# Patient Record
Sex: MALE | Race: WHITE | URBAN - METROPOLITAN AREA
[De-identification: names, ages, dates, MRNs, and addresses within clinical notes are randomized per-mention and may not be internally consistent; named-entity substitution may affect disease eponyms.]

---

## 2020-05-11 ENCOUNTER — APPOINTMENT (OUTPATIENT)
Dept: GENERAL RADIOLOGY | Facility: HOSPITAL | Age: 45
DRG: 312 | End: 2020-05-11
Attending: EMERGENCY MEDICINE
Payer: COMMERCIAL

## 2020-05-11 ENCOUNTER — APPOINTMENT (OUTPATIENT)
Dept: CT IMAGING | Facility: HOSPITAL | Age: 45
DRG: 312 | End: 2020-05-11
Attending: EMERGENCY MEDICINE
Payer: COMMERCIAL

## 2020-05-11 ENCOUNTER — HOSPITAL ENCOUNTER (INPATIENT)
Facility: HOSPITAL | Age: 45
LOS: 1 days | Discharge: HOME | DRG: 312 | End: 2020-05-12
Attending: EMERGENCY MEDICINE | Admitting: INTERNAL MEDICINE
Payer: COMMERCIAL

## 2020-05-11 DIAGNOSIS — R55 SYNCOPE AND COLLAPSE: Primary | ICD-10-CM

## 2020-05-11 PROCEDURE — 71045 X-RAY EXAM CHEST 1 VIEW: CPT | Performed by: EMERGENCY MEDICINE

## 2020-05-11 PROCEDURE — 99223 1ST HOSP IP/OBS HIGH 75: CPT | Performed by: HOSPITALIST

## 2020-05-11 PROCEDURE — 99255 IP/OBS CONSLTJ NEW/EST HI 80: CPT | Performed by: INTERNAL MEDICINE

## 2020-05-11 PROCEDURE — 70450 CT HEAD/BRAIN W/O DYE: CPT | Performed by: EMERGENCY MEDICINE

## 2020-05-11 RX ORDER — SODIUM CHLORIDE 9 MG/ML
INJECTION, SOLUTION INTRAVENOUS CONTINUOUS
Status: DISCONTINUED | OUTPATIENT
Start: 2020-05-11 | End: 2020-05-12

## 2020-05-11 RX ORDER — HYDRALAZINE HYDROCHLORIDE 20 MG/ML
10 INJECTION INTRAMUSCULAR; INTRAVENOUS EVERY 6 HOURS PRN
Status: DISCONTINUED | OUTPATIENT
Start: 2020-05-11 | End: 2020-05-12

## 2020-05-11 RX ORDER — ENOXAPARIN SODIUM 100 MG/ML
40 INJECTION SUBCUTANEOUS DAILY
Status: DISCONTINUED | OUTPATIENT
Start: 2020-05-11 | End: 2020-05-12

## 2020-05-11 RX ORDER — ONDANSETRON 2 MG/ML
4 INJECTION INTRAMUSCULAR; INTRAVENOUS EVERY 6 HOURS PRN
Status: DISCONTINUED | OUTPATIENT
Start: 2020-05-11 | End: 2020-05-12

## 2020-05-11 RX ORDER — PROCHLORPERAZINE EDISYLATE 5 MG/ML
5 INJECTION INTRAMUSCULAR; INTRAVENOUS EVERY 8 HOURS PRN
Status: DISCONTINUED | OUTPATIENT
Start: 2020-05-11 | End: 2020-05-12

## 2020-05-11 RX ORDER — MULTIVITAMIN WITH FOLIC ACID 400 MCG
1 TABLET ORAL DAILY
COMMUNITY

## 2020-05-11 RX ORDER — LOSARTAN POTASSIUM 25 MG/1
25 TABLET ORAL DAILY
Status: DISCONTINUED | OUTPATIENT
Start: 2020-05-11 | End: 2020-05-11

## 2020-05-11 RX ORDER — LOSARTAN POTASSIUM 25 MG/1
25 TABLET ORAL DAILY
Status: DISCONTINUED | OUTPATIENT
Start: 2020-05-11 | End: 2020-05-12

## 2020-05-11 RX ORDER — CHOLECALCIFEROL (VITAMIN D3) 50 MCG
2000 TABLET ORAL DAILY
COMMUNITY

## 2020-05-11 RX ORDER — LOSARTAN POTASSIUM 100 MG/1
100 TABLET ORAL DAILY
COMMUNITY

## 2020-05-11 RX ORDER — SODIUM CHLORIDE 9 MG/ML
INJECTION, SOLUTION INTRAVENOUS ONCE
Status: COMPLETED | OUTPATIENT
Start: 2020-05-11 | End: 2020-05-12

## 2020-05-11 RX ORDER — LOSARTAN POTASSIUM 25 MG/1
25 TABLET ORAL DAILY
COMMUNITY
End: 2020-05-11

## 2020-05-11 RX ORDER — ACETAMINOPHEN 325 MG/1
650 TABLET ORAL EVERY 6 HOURS PRN
Status: DISCONTINUED | OUTPATIENT
Start: 2020-05-11 | End: 2020-05-12

## 2020-05-11 NOTE — ED PROVIDER NOTES
Patient Seen in: [facility] Emergency Department      History   Patient presents with:  Syncope    Stated Complaint: weak, dizzy, syncopal episode over the weekend    HPI    Syncopal episode for 10 minutes on Saturday- felt sweaty and lightheaded, w Nose: Nose normal.   Eyes:      General: No scleral icterus. Right eye: No discharge. Left eye: No discharge. Conjunctiva/sclera: Conjunctivae normal.      Pupils: Pupils are equal, round, and reactive to light.    Neck:      Musculosk Normal   RAPID SARS-COV-2 BY PCR - Normal   CBC WITH DIFFERENTIAL WITH PLATELET    Narrative: The following orders were created for panel order CBC WITH DIFFERENTIAL WITH PLATELET.   Procedure                               Abnormality         Status Left CCA Peak Systolic Velocity:  66 centimeters/second                Left Proximal ICA Peak Systolic Velocity:  11.50 cm/s                Left Mid ICA Peak Systolic Velocity:  04.53 cm/s                Left Distal ICA Peak (CPT=71045)   Final Result    PROCEDURE:  XR CHEST AP PORTABLE  (CPT=71045)         TECHNIQUE:  AP chest radiograph was obtained. COMPARISON:  None.          INDICATIONS:  weak, dizzy, syncopal episode over the weekend         PATIENT STATED HISTORY

## 2020-05-12 ENCOUNTER — APPOINTMENT (OUTPATIENT)
Dept: CV DIAGNOSTICS | Facility: HOSPITAL | Age: 45
DRG: 312 | End: 2020-05-12
Attending: INTERNAL MEDICINE
Payer: COMMERCIAL

## 2020-05-12 ENCOUNTER — APPOINTMENT (OUTPATIENT)
Dept: ULTRASOUND IMAGING | Facility: HOSPITAL | Age: 45
DRG: 312 | End: 2020-05-12
Attending: INTERNAL MEDICINE
Payer: COMMERCIAL

## 2020-05-12 ENCOUNTER — HOSPITAL ENCOUNTER (OUTPATIENT)
Dept: CV DIAGNOSTICS | Facility: HOSPITAL | Age: 45
Discharge: HOME | End: 2020-05-12
Attending: INTERNAL MEDICINE
Payer: COMMERCIAL

## 2020-05-12 VITALS
SYSTOLIC BLOOD PRESSURE: 133 MMHG | OXYGEN SATURATION: 98 % | WEIGHT: 141.88 LBS | DIASTOLIC BLOOD PRESSURE: 85 MMHG | HEIGHT: 69 IN | BODY MASS INDEX: 21.02 KG/M2 | TEMPERATURE: 98 F | HEART RATE: 103 BPM | RESPIRATION RATE: 18 BRPM

## 2020-05-12 DIAGNOSIS — R55 SYNCOPE AND COLLAPSE: ICD-10-CM

## 2020-05-12 PROBLEM — E78.1 HYPERTRIGLYCERIDEMIA WITHOUT HYPERCHOLESTEROLEMIA: Status: ACTIVE | Noted: 2020-05-12

## 2020-05-12 PROCEDURE — 93350 STRESS TTE ONLY: CPT | Performed by: INTERNAL MEDICINE

## 2020-05-12 PROCEDURE — 93306 TTE W/DOPPLER COMPLETE: CPT | Performed by: INTERNAL MEDICINE

## 2020-05-12 PROCEDURE — 93880 EXTRACRANIAL BILAT STUDY: CPT | Performed by: INTERNAL MEDICINE

## 2020-05-12 PROCEDURE — 93226 XTRNL ECG REC<48 HR SCAN A/R: CPT | Performed by: INTERNAL MEDICINE

## 2020-05-12 PROCEDURE — 99233 SBSQ HOSP IP/OBS HIGH 50: CPT | Performed by: INTERNAL MEDICINE

## 2020-05-12 PROCEDURE — 93018 CV STRESS TEST I&R ONLY: CPT | Performed by: INTERNAL MEDICINE

## 2020-05-12 PROCEDURE — 93017 CV STRESS TEST TRACING ONLY: CPT | Performed by: INTERNAL MEDICINE

## 2020-05-12 PROCEDURE — 93227 XTRNL ECG REC<48 HR R&I: CPT | Performed by: INTERNAL MEDICINE

## 2020-05-12 PROCEDURE — 93225 XTRNL ECG REC<48 HRS REC: CPT | Performed by: INTERNAL MEDICINE

## 2020-05-12 RX ORDER — ACETAMINOPHEN 160 MG
2000 TABLET,DISINTEGRATING ORAL DAILY
Status: DISCONTINUED | OUTPATIENT
Start: 2020-05-12 | End: 2020-05-12

## 2020-05-12 RX ORDER — LOSARTAN POTASSIUM 100 MG/1
100 TABLET ORAL DAILY
Status: DISCONTINUED | OUTPATIENT
Start: 2020-05-12 | End: 2020-05-12

## 2020-05-12 RX ORDER — MULTIPLE VITAMINS W/ MINERALS TAB 9MG-400MCG
1 TAB ORAL DAILY
Status: DISCONTINUED | OUTPATIENT
Start: 2020-05-12 | End: 2020-05-12

## 2020-05-12 NOTE — PROGRESS NOTES
05/12/20 0430 05/12/20 0432 05/12/20 0434   Vital Signs   Pulse 68 73 84   Heart Rate Source Monitor  --  Monitor   Resp  --   --  16   Respiratory Quality  --   --  Normal   /90 (!) 143/94 (!) 141/98   BP Location Left arm Left arm Left arm   BP

## 2020-05-12 NOTE — H&P
[facility] H[facility]PITAL[facility]  History and Physical     [facility] Patient Status:  Emergency    6/3/1975 MRN [id number]   Location [address] [facility] Attending Oswald Quinn MD   Hosp Day # 0 PCP Noe Paul MD     Chief Complaint: Dominga List bilaterally. No wheezes. No rhonchi. Cardiovascular: S1, S2. Regular rate and rhythm. No murmurs, rubs or gallops. Equal pulses. Chest and Back: No tenderness or deformity. Abdomen: Soft, nontender, nondistended. Positive bowel sounds.  No rebound, gua

## 2020-05-12 NOTE — PLAN OF CARE
ADMISSION NOTES:  5/11 2300: Pt received from ER AOx4. Room air. RAC saline lock. Denies any pain. Admission questionnaires done.

## 2020-05-12 NOTE — CONSULTS
CARDIOLOGY CONSULT - [facility]      NAME: Luis Shook - ROOM: [facility] - MRN: [id number] - Age: 38year old - :  6/3/1975    Date of Admission: 2020  4:51 PM  Admission Diagnosis: No admission diagnoses are documente 05/11/20  1800 05/11/20  1845 05/11/20  1930 05/11/20 2030   BP: (!) 138/95 (!) 142/92 (!) 143/101 (!) 146/99   Pulse: 83 80 85 83   Resp: 14 16 12 14   Temp:       TempSrc:       SpO2: 99% 98% 98% 98%   Weight:           Oxygen Therapy  SpO2: 98 %  O2 De

## 2020-05-12 NOTE — PROGRESS NOTES
NURSING DISCHARGE NOTE    Discharged Home via Wheelchair. Accompanied by spouse  Belongings Taken by patient/family. Telemetry dc'd. IV removed, catheter intact. Discharge teaching completed, pt verbalized understanding.

## 2020-05-12 NOTE — PROGRESS NOTES
PRELIMINARY CARDIODIAGNOSTICS REPORT    No ST segment depression noted with exercise. No symptoms or arrhythmias. Pt walked for 6:00 on Kingsley protocol achieving a MHR of 153 (87% APMHR) and peak BP of 174/94.  Test was terminated due to difficulty in mendoza

## 2020-05-12 NOTE — ED NOTES
ASSUMED CARE OF PATIENT FROM Tootie Nelson RN. PT RESTING COMFORTABLY, DENIES C/O AT THIS TIME.   NAD

## 2020-05-12 NOTE — PROGRESS NOTES
[facility]  Progress Note    Luis Shook Patient Status:  Observation    6/3/1975 MRN [id number]   [facility] 8NE-A Attending Brittany Chávez MD   Hosp Day # 0 PCP Jerry Mensah MD       Assessment and Plan:  Patient Active Problem Alray Stable edema  Neurologic: Normal affect, alert and oriented x 3  Skin: Warm and dry.      Laboratory/Data:    Labs:  Lab Results   Component Value Date    WBC 6.0 05/11/2020    HGB 15.4 05/11/2020    HCT 43.0 05/11/2020    .0 05/11/2020    CREATSERUM 0.71 0

## 2020-05-12 NOTE — PROGRESS NOTES
Received patient from PMU this morning a/ox4; hemodynamically stable/neurologically intact; orthostatics negative; a/ox4; NSR on tele; no edema noted; lungs cta; remains on room air; denies any chest pain/pressure; up with sba; remains on IVF as ordered.

## 2020-05-12 NOTE — PROGRESS NOTES
[facility] Progress Note      Margo Escoto Patient Status:  Observation    6/3/1975 MRN [id number]   [facility] 8NE-A Attending Phil Lay MD   Hosp Day # 0 PCP Gabby Emerson MD     Subjective:  I had sent patient to [facility] Chemisty:  Recent Labs   Lab 05/11/20  1705      K 4.0      CO2 28.0   BUN 8   CREATSERUM 0.71   CA 9.3   MG 2.4   GLU 83     Recent Labs   Lab 05/11/20  1705   ALT 59   AST 36   ALB 4.0     Recent Labs   Lab 05/11/20 1705   INR 0.97     I

## 2020-05-12 NOTE — PLAN OF CARE
Assumed care of pt at 0000. A&O x4. Tele rhythm NSR. O2 sat WNL on room air. Pt denies c/o chest pain or shortness of breath. Pt voiding without issue. Pt tolerating ambulation well. NS running at 100cc/hr. Skin dry and intact.  Pt stated he already took hi

## 2020-05-16 NOTE — PAYOR COMM NOTE
--------------  ADMISSION REVIEW     Pay[employer]: [employer]  Subscriber #:  [id number]  Authorization Number: [id number]    Admit date: 5/12/20  Admit time: 4197       Admitting Physician: Praveen Rubio MD  Attending Physician:  Ninfa attLynnette Alas All other systems reviewed and negative except as noted above.     Physical Exam     ED Triage Vitals [05/11/20 1632]   BP (!) 169/97   Pulse 88   Resp 19   Temp 98.4 °F (36.9 °C)   Temp src Temporal   SpO2 98 %   O2 Device None (Room air)       Current:BP Cranial Nerves: No cranial nerve deficit.       Coordination: Coordination normal.   Psychiatric:         Behavior: Behavior normal.         Judgment: Judgment normal.             ED Course     Labs Reviewed   COMP METABOLIC PANEL (14) - Abnormal; Notabl B-mode 2-dimensional images of the vascular structures, Doppler spectral     analysis, and color flow Doppler imaging were     performed.   Stenosis measurements obtained in this exam were performed     using Consensus Panel categories and NASCET criteria PATIENT STATED HISTORY: (As transcribed by Technologist)  Patient presents     weak with dizziness               FINDINGS:      VENTRICLES/SULCI:  Ventricles and sulci are normal in size.       INTRACRANIAL:  There are no abnormal extraaxial fluid collect Pleasant 39 yo male- only significant history is HTN- present with near syncopal episode today but syncopal episode on Saturday- ekg benign, labs including troponin benign.   Discussed case with Dr Timothy Nuñez and he evaluated the patient in the ED- plans for st History of Present Illness: Yenny Allison is a 38year old male with a PMH of HTN presented to ED due near syncopal episode today and syncopal episode on Saturday. Patient reports on Saturday he was sitting talking to his brother and passed out.  Patient Musculoskeletal: Moves all extremities. Extremities: No edema or cyanosis. Integument: No rashes or lesions. Psychiatric: Appropriate mood and affect.       Diagnostic Data:      Labs:  Recent Labs   Lab 05/11/20  1705   WBC 6.0   HGB 15.4   MCV 94.3 On Saturday he was with his brother, felt warm and then passed out for about 10 minutes. It sounds like he was sitting at a table and his head went down to the table. He was diaphoretic and clammy. He eventually woke up.   His neighbor took his FSG and Last data filed at 5/11/2020 2033      Gross per 24 hour   Intake 1000 ml   Output —   Net 1000 ml         Rhythm:  Sinus  Neuro:  Intact, alert and oriented X 3, no focal deficits  Neck:  No jvd, no bruits  HEENT:  Symmetric  Gen:  Young, comfortable, no Pankaj Landeros Patient Status:  Observation    6/3/1975 MRN [id number]   [facility] 8NE-A Attending Fidelina Gupta MD   Hosp Day # 0 PCP Aime Edmond MD         Assessment and Plan:  Patient Active Problem List:     Syncope and collapse Laboratory/Data:     Labs:        Lab Results   Component Value Date     WBC 6.0 05/11/2020     HGB 15.4 05/11/2020     HCT 43.0 05/11/2020     .0 05/11/2020     CREATSERUM 0.71 05/11/2020     BUN 8 05/11/2020      05/11/2020     K 4.0 05/11/2

## 2020-05-20 ENCOUNTER — TELEPHONE (OUTPATIENT)
Dept: CARDIOLOGY | Age: 45
End: 2020-05-20

## 2020-05-27 ENCOUNTER — V-VISIT (OUTPATIENT)
Dept: CARDIOLOGY | Age: 45
End: 2020-05-27

## 2020-05-27 VITALS
HEIGHT: 69 IN | DIASTOLIC BLOOD PRESSURE: 88 MMHG | HEART RATE: 77 BPM | SYSTOLIC BLOOD PRESSURE: 135 MMHG | BODY MASS INDEX: 21.48 KG/M2 | WEIGHT: 145 LBS

## 2020-05-27 DIAGNOSIS — R42 LIGHTHEADEDNESS: Primary | ICD-10-CM

## 2020-05-27 PROCEDURE — 99442 TELEPHONE E&M BY PHYSICIAN EST PT NOT ORIG PREV 7 DAYS 11-20 MIN: CPT | Performed by: INTERNAL MEDICINE

## 2020-05-27 RX ORDER — CHOLECALCIFEROL (VITAMIN D3) 50 MCG
2000 TABLET ORAL DAILY
COMMUNITY

## 2020-05-27 RX ORDER — MULTIVITAMIN WITH FOLIC ACID 400 MCG
1 TABLET ORAL
COMMUNITY

## 2020-05-27 RX ORDER — LOSARTAN POTASSIUM 100 MG/1
100 TABLET ORAL
COMMUNITY

## 2020-05-27 SDOH — HEALTH STABILITY: PHYSICAL HEALTH: ON AVERAGE, HOW MANY DAYS PER WEEK DO YOU ENGAGE IN MODERATE TO STRENUOUS EXERCISE (LIKE A BRISK WALK)?: 0 DAYS

## 2020-05-27 ASSESSMENT — ENCOUNTER SYMPTOMS
HEMOPTYSIS: 0
WEIGHT GAIN: 0
SUSPICIOUS LESIONS: 0
WEIGHT LOSS: 0
COUGH: 0
BRUISES/BLEEDS EASILY: 0
ALLERGIC/IMMUNOLOGIC COMMENTS: NO NEW FOOD ALLERGIES
FEVER: 0
HEMATOCHEZIA: 0
CHILLS: 0

## 2022-06-01 ENCOUNTER — OFFICE VISIT (OUTPATIENT)
Dept: FAMILY MEDICINE CLINIC | Facility: CLINIC | Age: 47
End: 2022-06-01
Payer: COMMERCIAL

## 2022-06-01 VITALS
SYSTOLIC BLOOD PRESSURE: 130 MMHG | HEART RATE: 87 BPM | TEMPERATURE: 98 F | OXYGEN SATURATION: 97 % | BODY MASS INDEX: 21.37 KG/M2 | RESPIRATION RATE: 18 BRPM | DIASTOLIC BLOOD PRESSURE: 86 MMHG | WEIGHT: 141 LBS | HEIGHT: 68 IN

## 2022-06-01 DIAGNOSIS — I10 ESSENTIAL HYPERTENSION: ICD-10-CM

## 2022-06-01 DIAGNOSIS — E78.1 HYPERTRIGLYCERIDEMIA WITHOUT HYPERCHOLESTEROLEMIA: ICD-10-CM

## 2022-06-01 DIAGNOSIS — Z00.00 ANNUAL PHYSICAL EXAM: Primary | ICD-10-CM

## 2022-06-01 PROCEDURE — 99396 PREV VISIT EST AGE 40-64: CPT | Performed by: FAMILY MEDICINE

## 2022-06-01 PROCEDURE — 3008F BODY MASS INDEX DOCD: CPT | Performed by: FAMILY MEDICINE

## 2022-06-01 PROCEDURE — 3075F SYST BP GE 130 - 139MM HG: CPT | Performed by: FAMILY MEDICINE

## 2022-06-01 PROCEDURE — 3079F DIAST BP 80-89 MM HG: CPT | Performed by: FAMILY MEDICINE

## 2022-06-01 RX ORDER — LOSARTAN POTASSIUM 100 MG/1
100 TABLET ORAL DAILY
Qty: 90 TABLET | Refills: 0 | Status: SHIPPED | OUTPATIENT
Start: 2022-06-01

## 2022-08-13 ENCOUNTER — NURSE ONLY (OUTPATIENT)
Dept: FAMILY MEDICINE CLINIC | Facility: CLINIC | Age: 47
End: 2022-08-13
Payer: COMMERCIAL

## 2022-08-13 DIAGNOSIS — E78.1 HYPERTRIGLYCERIDEMIA WITHOUT HYPERCHOLESTEROLEMIA: ICD-10-CM

## 2022-08-13 DIAGNOSIS — Z00.00 ANNUAL PHYSICAL EXAM: ICD-10-CM

## 2022-08-13 DIAGNOSIS — I10 ESSENTIAL HYPERTENSION: ICD-10-CM

## 2022-08-13 LAB
ALBUMIN SERPL-MCNC: 3.7 G/DL (ref 3.4–5)
ALBUMIN/GLOB SERPL: 0.9 {RATIO} (ref 1–2)
ALP LIVER SERPL-CCNC: 82 U/L
ALT SERPL-CCNC: 60 U/L
ANION GAP SERPL CALC-SCNC: 5 MMOL/L (ref 0–18)
AST SERPL-CCNC: 42 U/L (ref 15–37)
BILIRUB SERPL-MCNC: 0.6 MG/DL (ref 0.1–2)
BUN BLD-MCNC: 11 MG/DL (ref 7–18)
CALCIUM BLD-MCNC: 9.3 MG/DL (ref 8.5–10.1)
CHLORIDE SERPL-SCNC: 104 MMOL/L (ref 98–112)
CHOLEST SERPL-MCNC: 127 MG/DL (ref ?–200)
CO2 SERPL-SCNC: 25 MMOL/L (ref 21–32)
CREAT BLD-MCNC: 0.93 MG/DL
ERYTHROCYTE [DISTWIDTH] IN BLOOD BY AUTOMATED COUNT: 11.5 %
FASTING PATIENT LIPID ANSWER: YES
FASTING STATUS PATIENT QL REPORTED: YES
GFR SERPLBLD BASED ON 1.73 SQ M-ARVRAT: 101 ML/MIN/1.73M2 (ref 60–?)
GLOBULIN PLAS-MCNC: 3.9 G/DL (ref 2.8–4.4)
GLUCOSE BLD-MCNC: 98 MG/DL (ref 70–99)
HCT VFR BLD AUTO: 46 %
HDLC SERPL-MCNC: 55 MG/DL (ref 40–59)
HGB BLD-MCNC: 16 G/DL
LDLC SERPL CALC-MCNC: 58 MG/DL (ref ?–100)
MCH RBC QN AUTO: 33.5 PG (ref 26–34)
MCHC RBC AUTO-ENTMCNC: 34.8 G/DL (ref 31–37)
MCV RBC AUTO: 96.4 FL
NONHDLC SERPL-MCNC: 72 MG/DL (ref ?–130)
OSMOLALITY SERPL CALC.SUM OF ELEC: 277 MOSM/KG (ref 275–295)
PLATELET # BLD AUTO: 307 10(3)UL (ref 150–450)
POTASSIUM SERPL-SCNC: 4.2 MMOL/L (ref 3.5–5.1)
PROT SERPL-MCNC: 7.6 G/DL (ref 6.4–8.2)
RBC # BLD AUTO: 4.77 X10(6)UL
SODIUM SERPL-SCNC: 134 MMOL/L (ref 136–145)
TRIGL SERPL-MCNC: 68 MG/DL (ref 30–149)
TSI SER-ACNC: 2.07 MIU/ML (ref 0.36–3.74)
VLDLC SERPL CALC-MCNC: 10 MG/DL (ref 0–30)
WBC # BLD AUTO: 6.6 X10(3) UL (ref 4–11)

## 2022-08-13 PROCEDURE — 85027 COMPLETE CBC AUTOMATED: CPT | Performed by: FAMILY MEDICINE

## 2022-08-13 PROCEDURE — 80061 LIPID PANEL: CPT | Performed by: FAMILY MEDICINE

## 2022-08-13 PROCEDURE — 80053 COMPREHEN METABOLIC PANEL: CPT | Performed by: FAMILY MEDICINE

## 2022-08-13 PROCEDURE — 84443 ASSAY THYROID STIM HORMONE: CPT | Performed by: FAMILY MEDICINE

## 2022-08-13 NOTE — PROGRESS NOTES
Em Rogkevin presents today for nurse visit. Labs ordered by Dr. Danika Johnson. Lavender and green tube drawn from left ac area with a butterfly needle. Patient tolerated well. Left office in stable condition.

## 2022-09-03 DIAGNOSIS — I10 ESSENTIAL HYPERTENSION: ICD-10-CM

## 2022-09-03 RX ORDER — LOSARTAN POTASSIUM 100 MG/1
TABLET ORAL
Qty: 90 TABLET | Refills: 0 | Status: SHIPPED | OUTPATIENT
Start: 2022-09-03

## 2022-09-03 NOTE — TELEPHONE ENCOUNTER
LOV 6/1/22 for a physical.     Hypertension Medications Protocol Passed 09/03/2022 10:06 AM   Protocol Details  CMP or BMP in past 12 months    Last serum creatinine< 2.0    Appointment in past 6 or next 3 months        No future appointments. Rx sent.

## 2023-01-14 ENCOUNTER — OFFICE VISIT (OUTPATIENT)
Dept: FAMILY MEDICINE CLINIC | Facility: CLINIC | Age: 48
End: 2023-01-14
Payer: COMMERCIAL

## 2023-01-14 VITALS
WEIGHT: 144 LBS | RESPIRATION RATE: 16 BRPM | DIASTOLIC BLOOD PRESSURE: 84 MMHG | BODY MASS INDEX: 21.82 KG/M2 | SYSTOLIC BLOOD PRESSURE: 130 MMHG | HEART RATE: 96 BPM | HEIGHT: 68 IN | OXYGEN SATURATION: 99 % | TEMPERATURE: 98 F

## 2023-01-14 DIAGNOSIS — I10 ESSENTIAL HYPERTENSION: Primary | ICD-10-CM

## 2023-01-14 PROCEDURE — 99213 OFFICE O/P EST LOW 20 MIN: CPT | Performed by: FAMILY MEDICINE

## 2023-01-14 PROCEDURE — 3008F BODY MASS INDEX DOCD: CPT | Performed by: FAMILY MEDICINE

## 2023-01-14 PROCEDURE — 3075F SYST BP GE 130 - 139MM HG: CPT | Performed by: FAMILY MEDICINE

## 2023-01-14 PROCEDURE — 3079F DIAST BP 80-89 MM HG: CPT | Performed by: FAMILY MEDICINE

## 2023-03-18 DIAGNOSIS — I10 ESSENTIAL HYPERTENSION: ICD-10-CM

## 2023-03-20 RX ORDER — LOSARTAN POTASSIUM 100 MG/1
TABLET ORAL
Qty: 90 TABLET | Refills: 0 | Status: SHIPPED | OUTPATIENT
Start: 2023-03-20

## 2023-03-20 NOTE — TELEPHONE ENCOUNTER
Hypertension Medications Protocol Passed 03/18/2023 03:23 PM   Protocol Details  CMP or BMP in past 12 months    Last serum creatinine< 2.0    Appointment in past 6 or next 3 months     No future appointments. Rx sent.

## 2023-08-07 ENCOUNTER — OFFICE VISIT (OUTPATIENT)
Dept: FAMILY MEDICINE CLINIC | Facility: CLINIC | Age: 48
End: 2023-08-07
Payer: COMMERCIAL

## 2023-08-07 VITALS
SYSTOLIC BLOOD PRESSURE: 130 MMHG | OXYGEN SATURATION: 98 % | WEIGHT: 148 LBS | RESPIRATION RATE: 21 BRPM | BODY MASS INDEX: 22.43 KG/M2 | HEIGHT: 68 IN | HEART RATE: 105 BPM | TEMPERATURE: 97 F | DIASTOLIC BLOOD PRESSURE: 82 MMHG

## 2023-08-07 DIAGNOSIS — Z00.00 ANNUAL PHYSICAL EXAM: Primary | ICD-10-CM

## 2023-08-07 DIAGNOSIS — I10 ESSENTIAL HYPERTENSION: ICD-10-CM

## 2023-08-07 DIAGNOSIS — Z12.11 COLON CANCER SCREENING: ICD-10-CM

## 2023-08-07 DIAGNOSIS — E78.1 HYPERTRIGLYCERIDEMIA WITHOUT HYPERCHOLESTEROLEMIA: ICD-10-CM

## 2023-08-07 LAB
ALBUMIN SERPL-MCNC: 3.8 G/DL (ref 3.4–5)
ALBUMIN/GLOB SERPL: 0.9 {RATIO} (ref 1–2)
ALP LIVER SERPL-CCNC: 92 U/L
ALT SERPL-CCNC: 57 U/L
ANION GAP SERPL CALC-SCNC: 7 MMOL/L (ref 0–18)
AST SERPL-CCNC: 38 U/L (ref 15–37)
BILIRUB SERPL-MCNC: 0.3 MG/DL (ref 0.1–2)
BUN BLD-MCNC: 9 MG/DL (ref 7–18)
CALCIUM BLD-MCNC: 8.9 MG/DL (ref 8.5–10.1)
CHLORIDE SERPL-SCNC: 109 MMOL/L (ref 98–112)
CHOLEST SERPL-MCNC: 157 MG/DL (ref ?–200)
CO2 SERPL-SCNC: 24 MMOL/L (ref 21–32)
CREAT BLD-MCNC: 0.95 MG/DL
EGFRCR SERPLBLD CKD-EPI 2021: 98 ML/MIN/1.73M2 (ref 60–?)
ERYTHROCYTE [DISTWIDTH] IN BLOOD BY AUTOMATED COUNT: 11.6 %
FASTING PATIENT LIPID ANSWER: NO
FASTING STATUS PATIENT QL REPORTED: NO
GLOBULIN PLAS-MCNC: 4.2 G/DL (ref 2.8–4.4)
GLUCOSE BLD-MCNC: 92 MG/DL (ref 70–99)
HCT VFR BLD AUTO: 45.2 %
HDLC SERPL-MCNC: 50 MG/DL (ref 40–59)
HGB BLD-MCNC: 15.8 G/DL
LDLC SERPL CALC-MCNC: 64 MG/DL (ref ?–100)
MCH RBC QN AUTO: 33.5 PG (ref 26–34)
MCHC RBC AUTO-ENTMCNC: 35 G/DL (ref 31–37)
MCV RBC AUTO: 95.8 FL
NONHDLC SERPL-MCNC: 107 MG/DL (ref ?–130)
OSMOLALITY SERPL CALC.SUM OF ELEC: 288 MOSM/KG (ref 275–295)
PLATELET # BLD AUTO: 338 10(3)UL (ref 150–450)
POTASSIUM SERPL-SCNC: 4.3 MMOL/L (ref 3.5–5.1)
PROT SERPL-MCNC: 8 G/DL (ref 6.4–8.2)
RBC # BLD AUTO: 4.72 X10(6)UL
SODIUM SERPL-SCNC: 140 MMOL/L (ref 136–145)
TRIGL SERPL-MCNC: 273 MG/DL (ref 30–149)
TSI SER-ACNC: 0.99 MIU/ML (ref 0.36–3.74)
VLDLC SERPL CALC-MCNC: 41 MG/DL (ref 0–30)
WBC # BLD AUTO: 5.5 X10(3) UL (ref 4–11)

## 2023-08-07 PROCEDURE — 85027 COMPLETE CBC AUTOMATED: CPT | Performed by: FAMILY MEDICINE

## 2023-08-07 PROCEDURE — 80053 COMPREHEN METABOLIC PANEL: CPT | Performed by: FAMILY MEDICINE

## 2023-08-07 PROCEDURE — 3008F BODY MASS INDEX DOCD: CPT | Performed by: FAMILY MEDICINE

## 2023-08-07 PROCEDURE — 84443 ASSAY THYROID STIM HORMONE: CPT | Performed by: FAMILY MEDICINE

## 2023-08-07 PROCEDURE — 3079F DIAST BP 80-89 MM HG: CPT | Performed by: FAMILY MEDICINE

## 2023-08-07 PROCEDURE — 99396 PREV VISIT EST AGE 40-64: CPT | Performed by: FAMILY MEDICINE

## 2023-08-07 PROCEDURE — 80061 LIPID PANEL: CPT | Performed by: FAMILY MEDICINE

## 2023-08-07 PROCEDURE — 3075F SYST BP GE 130 - 139MM HG: CPT | Performed by: FAMILY MEDICINE

## 2023-08-12 ENCOUNTER — TELEPHONE (OUTPATIENT)
Dept: FAMILY MEDICINE CLINIC | Facility: CLINIC | Age: 48
End: 2023-08-12

## 2023-08-12 NOTE — TELEPHONE ENCOUNTER
Called patient and left a voicemail to call back. [place] guard faxed a note stating Sample could not be processed. I am not sure if they called patient so I wanted to let patient know to contact them so they can send another box.

## 2023-08-16 DIAGNOSIS — I10 ESSENTIAL HYPERTENSION: ICD-10-CM

## 2023-08-17 RX ORDER — LOSARTAN POTASSIUM 100 MG/1
TABLET ORAL
Qty: 90 TABLET | Refills: 0 | Status: SHIPPED | OUTPATIENT
Start: 2023-08-17

## 2023-08-17 NOTE — TELEPHONE ENCOUNTER
Last refilled 3/20/23  LOV with  8/7/23  No future appt  Last CMP 8/7/23    Hypertension Medications Protocol [id number]/16/2023 06:57 PM   Protocol Details CMP or BMP in past 12 months    Last serum creatinine< 2.0    Appointment in past 6 or next 3 months

## 2023-09-09 LAB — AMB EXT COLOGUARD RESULT: NEGATIVE

## 2023-09-18 ENCOUNTER — TELEPHONE (OUTPATIENT)
Dept: FAMILY MEDICINE CLINIC | Facility: CLINIC | Age: 48
End: 2023-09-18

## 2023-11-30 DIAGNOSIS — I10 ESSENTIAL HYPERTENSION: ICD-10-CM

## 2023-12-01 RX ORDER — LOSARTAN POTASSIUM 100 MG/1
TABLET ORAL
Qty: 90 TABLET | Refills: 0 | Status: SHIPPED | OUTPATIENT
Start: 2023-12-01

## 2023-12-01 NOTE — TELEPHONE ENCOUNTER
LOV: 8/7/23 for physical    LOSARTAN 100 MG Oral Tab  TAKE 1 TABLET(100 MG) BY MOUTH DAILY Dispense: 90 tablet, Refills: 0 ordered       08/17/2023         Hypertension Medications Protocol [id number]/30/2023 07:09 PM   Protocol Details CMP or BMP in past 12 months    Last serum creatinine< 2.0    Appointment in past 6 or next 3 months

## 2024-02-26 DIAGNOSIS — I10 ESSENTIAL HYPERTENSION: ICD-10-CM

## 2024-02-27 RX ORDER — LOSARTAN POTASSIUM 100 MG/1
100 TABLET ORAL DAILY
Qty: 90 TABLET | Refills: 0 | Status: SHIPPED | OUTPATIENT
Start: 2024-02-27

## 2024-07-11 DIAGNOSIS — I10 ESSENTIAL HYPERTENSION: ICD-10-CM

## 2024-07-11 RX ORDER — LOSARTAN POTASSIUM 100 MG/1
100 TABLET ORAL DAILY
Qty: 90 TABLET | Refills: 0 | Status: SHIPPED | OUTPATIENT
Start: 2024-07-11

## 2024-08-22 ENCOUNTER — OFFICE VISIT (OUTPATIENT)
Dept: FAMILY MEDICINE CLINIC | Facility: CLINIC | Age: 49
End: 2024-08-22
Payer: COMMERCIAL

## 2024-08-22 VITALS
BODY MASS INDEX: 22.73 KG/M2 | HEIGHT: 68 IN | WEIGHT: 150 LBS | OXYGEN SATURATION: 97 % | DIASTOLIC BLOOD PRESSURE: 92 MMHG | SYSTOLIC BLOOD PRESSURE: 156 MMHG | TEMPERATURE: 98 F | HEART RATE: 87 BPM

## 2024-08-22 DIAGNOSIS — F17.290 OTHER TOBACCO PRODUCT NICOTINE DEPENDENCE, UNCOMPLICATED: ICD-10-CM

## 2024-08-22 DIAGNOSIS — I10 WHITE COAT SYNDROME WITH DIAGNOSIS OF HYPERTENSION: ICD-10-CM

## 2024-08-22 DIAGNOSIS — Z12.5 SCREENING FOR PROSTATE CANCER: ICD-10-CM

## 2024-08-22 DIAGNOSIS — I10 ESSENTIAL HYPERTENSION: ICD-10-CM

## 2024-08-22 DIAGNOSIS — Z00.00 GENERAL MEDICAL EXAM: Primary | ICD-10-CM

## 2024-08-22 DIAGNOSIS — E78.1 HYPERTRIGLYCERIDEMIA WITHOUT HYPERCHOLESTEROLEMIA: ICD-10-CM

## 2024-08-22 PROBLEM — F17.200 NICOTINE DEPENDENCE: Status: ACTIVE | Noted: 2024-08-22

## 2024-08-22 PROCEDURE — 3080F DIAST BP >= 90 MM HG: CPT | Performed by: NURSE PRACTITIONER

## 2024-08-22 PROCEDURE — 3077F SYST BP >= 140 MM HG: CPT | Performed by: NURSE PRACTITIONER

## 2024-08-22 PROCEDURE — 3008F BODY MASS INDEX DOCD: CPT | Performed by: NURSE PRACTITIONER

## 2024-08-22 PROCEDURE — 99396 PREV VISIT EST AGE 40-64: CPT | Performed by: NURSE PRACTITIONER

## 2024-08-22 NOTE — PROGRESS NOTES
HPI:   Patient is here for physical today.    Concerns: patient is here for his yearly physical today and blood pressure check. Reports that his only concern today is that he is having some pain in his left hand. Patient is worried because he is a  and he needs his hands to work. Patient reports that the pain began 2 days ago when he was putting suspension back together on a car and something popped out of his wrist. Patient states that it did not hurt too bad at first but then yesterday started bothering him because it pops in and out. States that it does not hurt but that it is just uncomfortable. A work injury report was filed with employer. Patient has not tried treating it with anything. No previous or old injury to the area. No other concerns.     Patient quit smoking about a month ago. Last home BP check was 4 days ago and was 130/86 at that time. Denies any side effects of the current BP medications.     Last Colon Cancer Screen: 9/2023, negative Cologard  Last PSA: overdue    Exercise: walking. And is a  so physical job   Diet: doesn't watch, work is mostly junk food, cooks at home     Wt Readings from Last 6 Encounters:   08/22/24 150 lb (68 kg)   08/07/23 148 lb (67.1 kg)   01/14/23 144 lb (65.3 kg)   06/01/22 141 lb (64 kg)   05/12/20 141 lb 14.4 oz (64.4 kg)     Body mass index is 22.81 kg/m².     There is no immunization history on file for this patient.  Cholesterol, Total (mg/dL)   Date Value   08/07/2023 157   08/13/2022 127   05/12/2020 160     HDL Cholesterol (mg/dL)   Date Value   08/07/2023 50   08/13/2022 55   05/12/2020 46     LDL Cholesterol (mg/dL)   Date Value   08/07/2023 64   08/13/2022 58   05/12/2020 60     AST (U/L)   Date Value   08/07/2023 38 (H)   08/13/2022 42 (H)   05/11/2020 36     ALT (U/L)   Date Value   08/07/2023 57   08/13/2022 60   05/11/2020 59        Allergies:  No Known Allergies   Current Meds:  Current Outpatient Medications on File Prior to Visit    Medication Sig Dispense Refill    LOSARTAN 100 MG Oral Tab TAKE 1 TABLET(100 MG) BY MOUTH DAILY 90 tablet 0    Multiple Vitamins-Minerals (TAB-A-PETER) Oral Tab Take 1 tablet by mouth daily.      Cholecalciferol (VITAMIN D) 50 MCG (2000 UT) Oral Tab Take 2,000 Units by mouth daily.       No current facility-administered medications on file prior to visit.        History:  Past Medical History:    Essential hypertension      History reviewed. No pertinent surgical history.   Family History   Problem Relation Age of Onset    Dementia Father     Other (parkinson) Paternal Grandfather     Cancer Neg       Family Status   Relation Status    Fa (Not Specified)    PGFA (Not Specified)    NEG (Not Specified)      Social History     Socioeconomic History    Marital status:    Tobacco Use    Smoking status: Every Day     Current packs/day: 0.00     Types: Cigarettes     Last attempt to quit: 2018     Years since quittin.2    Smokeless tobacco: Current     Types: Chew    Tobacco comments:     Quit smoking about 2 months ago     Does do zyn pouches 3/4 a day   Vaping Use    Vaping status: Never Used   Substance and Sexual Activity    Alcohol use: Yes     Alcohol/week: 14.0 standard drinks of alcohol     Types: 14 Cans of beer per week     Comment: 2 DAILY BEERS    Drug use: Never   Other Topics Concern    Caffeine Concern No    Exercise No    Seat Belt No    Special Diet No    Stress Concern No    Weight Concern No     Social Determinants of Health     Physical Activity: Unknown (2020)    [facility]    Exercise Vital Sign     Days of Exercise per Week: 0 days        REVIEW OF SYSTEMS:   Review of Systems   Constitutional:  Negative for activity change, appetite change, fatigue, fever and unexpected weight change.   HENT:  Negative for hearing loss.    Eyes:  Negative for visual disturbance.   Respiratory:  Negative for cough, chest tightness and shortness of  breath.    Cardiovascular:  Negative for chest pain and palpitations.   Gastrointestinal:  Negative for constipation, diarrhea, nausea and vomiting.   Genitourinary:  Negative for difficulty urinating.   Musculoskeletal:  Negative for back pain, joint swelling, myalgias, neck pain and neck stiffness.        Left wrist strain secondary to work injury    Skin:  Negative for rash and wound.   Neurological:  Negative for dizziness, seizures, facial asymmetry and numbness.   Psychiatric/Behavioral:  Negative for agitation, behavioral problems and confusion.         Objective   EXAM:   BP (!) 156/92   Pulse 87   Temp 98.1 °F (36.7 °C)   Ht 5' 8\" (1.727 m)   Wt 150 lb (68 kg)   SpO2 97%   BMI 22.81 kg/m²   Patient weight not recorded   Physical Exam  Constitutional:       General: He is not in acute distress.     Appearance: Normal appearance. He is normal weight. He is not ill-appearing.   HENT:      Head: Normocephalic and atraumatic.      Right Ear: Tympanic membrane normal. There is no impacted cerumen.      Left Ear: Tympanic membrane normal. There is no impacted cerumen.      Nose: Nose normal. No congestion or rhinorrhea.      Mouth/Throat:      Pharynx: No oropharyngeal exudate or posterior oropharyngeal erythema.   Eyes:      Extraocular Movements: Extraocular movements intact.      Pupils: Pupils are equal, round, and reactive to light.   Cardiovascular:      Rate and Rhythm: Normal rate and regular rhythm.      Pulses: Normal pulses.      Heart sounds: Normal heart sounds. No murmur heard.     No friction rub. No gallop.   Pulmonary:      Effort: Pulmonary effort is normal. No respiratory distress.      Breath sounds: Normal breath sounds. No wheezing.   Abdominal:      General: Bowel sounds are normal. There is no distension.      Tenderness: There is no abdominal tenderness.   Musculoskeletal:         General: Signs of injury present.      Cervical back: Normal range of motion and neck supple. No  rigidity or tenderness.      Comments: Injury to the left wrist    Lymphadenopathy:      Cervical: No cervical adenopathy.   Neurological:      General: No focal deficit present.      Mental Status: He is alert and oriented to person, place, and time. Mental status is at baseline.      Cranial Nerves: No cranial nerve deficit.      Motor: No weakness.   Psychiatric:         Mood and Affect: Mood normal.         Behavior: Behavior normal.         Thought Content: Thought content normal.         Judgment: Judgment normal.              ASSESSMENT AND PLAN     Diagnoses and all orders for this visit:    General medical exam  -     PSA Total, Screen; Future  -     TSH W Reflex To Free T4; Future  -     Lipid Panel; Future  -     Comp Metabolic Panel (14); Future  -     CBC With Differential With Platelet; Future    Screening for prostate cancer  -     PSA Total, Screen; Future    Essential hypertension  -     Lipid Panel; Future  -     Comp Metabolic Panel (14); Future    Hypertriglyceridemia without hypercholesterolemia  -     Lipid Panel; Future  -     Comp Metabolic Panel (14); Future    Other tobacco product nicotine dependence, uncomplicated    White coat syndrome with diagnosis of hypertension       Will return for fasting labs  Will establish with work comp office regarding hand pain since work related  Blood pressure elevated. Recent nicotine use and has white coat HTN. Will check blood pressure at home and let us know result  States he is up to date with vaccinations. Will obtain copy of records from previous physician  He has quit smoking. Working on stopping nicotine packs as well    Patient discussed with student. Independent exam performed. Agree with assessment and plan.

## 2024-09-07 ENCOUNTER — NURSE ONLY (OUTPATIENT)
Dept: FAMILY MEDICINE CLINIC | Facility: CLINIC | Age: 49
End: 2024-09-07
Payer: COMMERCIAL

## 2024-09-07 DIAGNOSIS — Z12.5 SCREENING FOR PROSTATE CANCER: ICD-10-CM

## 2024-09-07 DIAGNOSIS — I10 ESSENTIAL HYPERTENSION: ICD-10-CM

## 2024-09-07 DIAGNOSIS — E78.1 HYPERTRIGLYCERIDEMIA WITHOUT HYPERCHOLESTEROLEMIA: ICD-10-CM

## 2024-09-07 DIAGNOSIS — Z00.00 GENERAL MEDICAL EXAM: ICD-10-CM

## 2024-09-07 LAB
ALBUMIN SERPL-MCNC: 4.3 G/DL (ref 3.2–4.8)
ALBUMIN/GLOB SERPL: 1.4 {RATIO} (ref 1–2)
ALP LIVER SERPL-CCNC: 72 U/L
ALT SERPL-CCNC: 66 U/L
ANION GAP SERPL CALC-SCNC: 9 MMOL/L (ref 0–18)
AST SERPL-CCNC: 48 U/L (ref ?–34)
BASOPHILS # BLD AUTO: 0.04 X10(3) UL (ref 0–0.2)
BASOPHILS NFR BLD AUTO: 1.1 %
BILIRUB SERPL-MCNC: 0.5 MG/DL (ref 0.3–1.2)
BUN BLD-MCNC: 11 MG/DL (ref 9–23)
CALCIUM BLD-MCNC: 9.6 MG/DL (ref 8.7–10.4)
CHLORIDE SERPL-SCNC: 105 MMOL/L (ref 98–112)
CHOLEST SERPL-MCNC: 167 MG/DL (ref ?–200)
CO2 SERPL-SCNC: 25 MMOL/L (ref 21–32)
COMPLEXED PSA SERPL-MCNC: 1.15 NG/ML (ref ?–4)
CREAT BLD-MCNC: 0.8 MG/DL
EGFRCR SERPLBLD CKD-EPI 2021: 108 ML/MIN/1.73M2 (ref 60–?)
EOSINOPHIL # BLD AUTO: 0.16 X10(3) UL (ref 0–0.7)
EOSINOPHIL NFR BLD AUTO: 4.4 %
ERYTHROCYTE [DISTWIDTH] IN BLOOD BY AUTOMATED COUNT: 11.8 %
FASTING PATIENT LIPID ANSWER: YES
FASTING STATUS PATIENT QL REPORTED: YES
GLOBULIN PLAS-MCNC: 3 G/DL (ref 2–3.5)
GLUCOSE BLD-MCNC: 107 MG/DL (ref 70–99)
HCT VFR BLD AUTO: 40.7 %
HDLC SERPL-MCNC: 53 MG/DL (ref 40–59)
HGB BLD-MCNC: 14.5 G/DL
IMM GRANULOCYTES # BLD AUTO: 0.02 X10(3) UL (ref 0–1)
IMM GRANULOCYTES NFR BLD: 0.5 %
LDLC SERPL CALC-MCNC: 68 MG/DL (ref ?–100)
LYMPHOCYTES # BLD AUTO: 1.41 X10(3) UL (ref 1–4)
LYMPHOCYTES NFR BLD AUTO: 38.4 %
MCH RBC QN AUTO: 33.8 PG (ref 26–34)
MCHC RBC AUTO-ENTMCNC: 35.6 G/DL (ref 31–37)
MCV RBC AUTO: 94.9 FL
MONOCYTES # BLD AUTO: 0.32 X10(3) UL (ref 0.1–1)
MONOCYTES NFR BLD AUTO: 8.7 %
NEUTROPHILS # BLD AUTO: 1.72 X10 (3) UL (ref 1.5–7.7)
NEUTROPHILS # BLD AUTO: 1.72 X10(3) UL (ref 1.5–7.7)
NEUTROPHILS NFR BLD AUTO: 46.9 %
NONHDLC SERPL-MCNC: 114 MG/DL (ref ?–130)
OSMOLALITY SERPL CALC.SUM OF ELEC: 288 MOSM/KG (ref 275–295)
PLATELET # BLD AUTO: 298 10(3)UL (ref 150–450)
POTASSIUM SERPL-SCNC: 4.2 MMOL/L (ref 3.5–5.1)
PROT SERPL-MCNC: 7.3 G/DL (ref 5.7–8.2)
RBC # BLD AUTO: 4.29 X10(6)UL
SODIUM SERPL-SCNC: 139 MMOL/L (ref 136–145)
TRIGL SERPL-MCNC: 291 MG/DL (ref 30–149)
TSI SER-ACNC: 1.38 MIU/ML (ref 0.55–4.78)
VLDLC SERPL CALC-MCNC: 44 MG/DL (ref 0–30)
WBC # BLD AUTO: 3.7 X10(3) UL (ref 4–11)

## 2024-09-07 PROCEDURE — 80061 LIPID PANEL: CPT | Performed by: NURSE PRACTITIONER

## 2024-09-07 PROCEDURE — 36415 COLL VENOUS BLD VENIPUNCTURE: CPT

## 2024-09-07 PROCEDURE — 84153 ASSAY OF PSA TOTAL: CPT | Performed by: NURSE PRACTITIONER

## 2024-09-07 PROCEDURE — 80050 GENERAL HEALTH PANEL: CPT | Performed by: NURSE PRACTITIONER

## 2024-09-07 NOTE — PROGRESS NOTES
Patient here for fasting labs    1 attempt - right anticubital  3 tubes drawn - 1 gold, 1 green, 1 lavendar    Patient tolerated well.

## 2024-09-09 ENCOUNTER — TELEPHONE (OUTPATIENT)
Dept: FAMILY MEDICINE CLINIC | Facility: CLINIC | Age: 49
End: 2024-09-09

## 2024-09-09 DIAGNOSIS — D72.819 LEUKOPENIA, UNSPECIFIED TYPE: Primary | ICD-10-CM

## 2024-09-09 DIAGNOSIS — R79.89 ELEVATED LFTS: ICD-10-CM

## 2024-09-09 NOTE — TELEPHONE ENCOUNTER
----- Message from Shadia Rudd sent at 9/9/2024  7:48 AM CDT -----  Results reviewed.   WBC's mildly low, no anemia. Recheck in 1 month for comparison  Triglycerides mildly elevated, but otherwise stable lipid. Recommend Fish Oil Supplement.   Mildly elevated liver enzymes otherwise. I would recommend Abd ultrasound to look at liver.   Thyroid function normal  PSA normal    What is patient's blood pressure at home?

## 2024-09-09 NOTE — TELEPHONE ENCOUNTER
Written by MOHINDER Barrow on 9/9/2024  7:48 AM CDT View Full Comments  Seen by patient Álvaro Moe on 9/9/2024  8:09 AM    Reminder placed for 1 month

## 2024-09-09 NOTE — TELEPHONE ENCOUNTER
Delivery Read From Message Type Attachments Subject   9/9/2024  9:10 AM Y Barby Siddiqui RN Patient Medical Advice Request  blood pressure

## 2024-09-13 RX ORDER — AMLODIPINE BESYLATE 5 MG/1
5 TABLET ORAL DAILY
Qty: 30 TABLET | Refills: 0 | Status: SHIPPED | OUTPATIENT
Start: 2024-09-13 | End: 2024-10-13

## 2024-09-13 RX ORDER — AMLODIPINE BESYLATE 5 MG/1
5 TABLET ORAL DAILY
Qty: 90 TABLET | Refills: 0 | OUTPATIENT
Start: 2024-09-13

## 2024-09-13 NOTE — TELEPHONE ENCOUNTER
Blood pressure not at goal. Please enter into Epic  Will add 2nd blood pressure medication and needs to schedule office visit in 2-3 weeks  We could repeat liver enzymes in 1 month with CBC. Drink plenty of fluids and avoid alcohol

## 2024-10-08 ENCOUNTER — HOSPITAL ENCOUNTER (OUTPATIENT)
Dept: GENERAL RADIOLOGY | Age: 49
Discharge: HOME | End: 2024-10-08
Attending: STUDENT IN AN ORGANIZED HEALTH CARE EDUCATION/TRAINING PROGRAM
Payer: COMMERCIAL

## 2024-10-08 ENCOUNTER — OFFICE VISIT (OUTPATIENT)
Facility: CLINIC | Age: 49
End: 2024-10-08
Payer: OTHER MISCELLANEOUS

## 2024-10-08 DIAGNOSIS — M79.642 LEFT HAND PAIN: ICD-10-CM

## 2024-10-08 DIAGNOSIS — M67.40 GANGLION CYST: Primary | ICD-10-CM

## 2024-10-08 PROCEDURE — 73130 X-RAY EXAM OF HAND: CPT | Performed by: STUDENT IN AN ORGANIZED HEALTH CARE EDUCATION/TRAINING PROGRAM

## 2024-10-08 NOTE — PROGRESS NOTES
Clinic Note     No Known Allergies    CC: Left wrist pain with associated mass    HPI: This 49 year old RHD male presents with left wrist pain since 2024. 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 He has seen several providers since that time with persistent left wrist pain, despite treatments such as bracing, rest, OTC meds. He notes an associated mass with the wrist pain as well. His pain is almost directly centered over this mass.     Review of Systems:  Negative unless stated in HPI.    History/Other:   Past Medical History:    Essential hypertension     No past surgical history on file.  Current Outpatient Medications   Medication Sig Dispense Refill    amLODIPine 5 MG Oral Tab Take 1 tablet (5 mg total) by mouth daily. 30 tablet 0    LOSARTAN 100 MG Oral Tab TAKE 1 TABLET(100 MG) BY MOUTH DAILY 90 tablet 0    Multiple Vitamins-Minerals (TAB-A-PETER) Oral Tab Take 1 tablet by mouth daily.      Cholecalciferol (VITAMIN D) 50 MCG (2000 UT) Oral Tab Take 2,000 Units by mouth daily.       Family History   Problem Relation Age of Onset    Dementia Father     Other (parkinson) Paternal Grandfather     Cancer Neg      Social History     Occupational History    Not on file   Tobacco Use    Smoking status: Every Day     Current packs/day: 0.00     Types: Cigarettes     Last attempt to quit: 2018     Years since quittin.3    Smokeless tobacco: Current     Types: Chew    Tobacco comments:     Quit smoking about 2 months ago     Does do zyn pouches 3/4 a day   Vaping Use    Vaping status: Never Used   Substance and Sexual Activity    Alcohol use: Yes     Alcohol/week: 14.0 standard drinks of alcohol     Types: 14 Cans of beer per week     Comment: 2 DAILY BEERS    Drug use: Never    Sexual activity: Not on file        Physical Exam:     There were no vitals taken for this visit.    Constitutional: NAD. AOx3. Well-developed and Well-nourished.    Psychiatric: Normal mood/ affect/ behavior. Judgment and thought content normal.     Left Upper Extremity:     Inspection    Skin intact. No skin lesions. No obvious mass visualized. No crepitus to ROM. Mild swelling.   Palpation    no tenderness to palpation at the radial styloid  no tenderness to palpation at the DRUJ  no tenderness to palpation at the SL interval  no tenderness to palpation at the 1st carpometacarpal joint   no Tenderness to palpation of the ulnar fovea, TFCC  2mm by 3mm soft, mobile, well-circumscribed mass located at the volar radial aspect of the left wrist        ROM is grossly normal including flexion / extension, and pronosupination of the forearm.    Able to make a full composite fist   Able to extend all digits     Neurovascular    Normal sensation in the median, ulnar, and radial nerve distribution. Normal motor function of muscles innervated by median/AIN, ulnar, and radial/PIN nerves.    Normally perfused hand(s).                   Diagnostic Studies:  I reviewed the images independently from the professional interpretation, and discussed my interpretation of the pertinent findings with patient/family.    Xrays of Left wrist 3V: On my independent review of the radiographs, there is no acute bony abnormality noted.    Assessment/Plan:  49 year old male with Left mass    I reviewed the patient's electronic medical record, including the pertinent office notes, medical/surgical history, medications and images. I specifically reviewed the images noted above, independently and discussed my independent interpretation of these images in combination with the pertinent positive and negative findings in my clinical exam with the patient    Left wrist mass    The nature of the condition was discussed with the patient today including reviewing the xrays. The risks/benefits, pros/cons and reasonable expectations of treatment options were also discussed today. Education and counselling was given  for the above diagnosis.  I discussed with the patient that more than likely this represents a benign ganglion cyst. These are benign masses and as such observation is a perfectly reasonable option.       Given the location of the mass over the volar radial aspect of the wrist, I believe it would be prudent to obtain advanced imaging prior to proceeding with surgical intervention. I will order an MRI of the Left wrist today; the patient will follow up after obtaining the MRI. We will review the results and discuss possible surgical intervention at that time.    Follow Up: after obtaining MRI    Brandon Thomas MD   Hand, Wrist, & Elbow Surgery  [email or web address]

## 2024-10-09 ENCOUNTER — TELEPHONE (OUTPATIENT)
Dept: FAMILY MEDICINE CLINIC | Facility: CLINIC | Age: 49
End: 2024-10-09

## 2024-10-10 DIAGNOSIS — I10 ESSENTIAL HYPERTENSION: ICD-10-CM

## 2024-10-11 NOTE — TELEPHONE ENCOUNTER
LOV: 8/22/24 for general medical exam      LOSARTAN 100 MG Oral Tab  TAKE 1 TABLET(100 MG) BY MOUTH DAILY Dispense: 90 tablet, Refills: 0 ordered        07/11/2024     Future Appointments   Date Time Provider Department Center   11/11/2024  2:30 PM  MR RM3 (3T WIDE)  [facility]     Please advise

## 2024-10-14 NOTE — TELEPHONE ENCOUNTER
Hypertension Medications Protocol Pyqqnn53/14/2024 10:28 AM   Protocol Details Last BP reading less than 140/90    CMP or BMP in past 12 months    In person appointment or virtual visit in the past 12 mos or appointment in next 3 mos    EGFRCR or GFRNAA > 50     Request for  AMLODIPINE 5 MG Oral Tab     LOV 8/22/24 with Shadia Rudd APRN   Last refill 9/13/24 - 30 tablets 0 refill   Future Appointments   Date Time Provider Department Center   11/11/2024  2:30 PM [facility] MR RM3 (3T WIDE) [facility]    Labs 9/7/24     BP Readings from Last 3 Encounters:   08/22/24 (!) 156/92   08/07/23 130/82   01/14/23 130/84

## 2024-10-14 NOTE — TELEPHONE ENCOUNTER
Aziza Munoz, RN to Álvaro Moe III   NL      10/11/24  7:48 AM  Chad Rea,    Have you been able to check your blood pressure at home? If so, can you please send us the reading.     Thank you    Last read by Álvaro Moe III at  8:42 AM on 10/11/2024.

## 2024-10-16 DIAGNOSIS — I10 ESSENTIAL HYPERTENSION: ICD-10-CM

## 2024-10-16 RX ORDER — LOSARTAN POTASSIUM 100 MG/1
100 TABLET ORAL DAILY
Qty: 30 TABLET | Refills: 0 | Status: SHIPPED | OUTPATIENT
Start: 2024-10-16

## 2024-10-16 RX ORDER — AMLODIPINE BESYLATE 5 MG/1
5 TABLET ORAL DAILY
Qty: 30 TABLET | Refills: 0 | Status: SHIPPED | OUTPATIENT
Start: 2024-10-16

## 2024-10-16 RX ORDER — LOSARTAN POTASSIUM 100 MG/1
100 TABLET ORAL DAILY
Qty: 90 TABLET | Refills: 0 | OUTPATIENT
Start: 2024-10-16

## 2024-10-16 NOTE — TELEPHONE ENCOUNTER
Hypertension Medications Protocol Qauacz57/16/2024 08:55 AM   Protocol Details Last BP reading less than 140/90    CMP or BMP in past 12 months    In person appointment or virtual visit in the past 12 mos or appointment in next 3 mos    EGFRCR or GFRNAA > 50     Request for  LOSARTAN 100 MG Oral Tab     LOV 8/22/24 with Shadia Rudd APRN   Last refill 7/11/24 - 90 tablets 0 refill   Future Appointments   Date Time Provider Department Center   11/11/2024  2:30 PM [facility] MR RM3 (3T WIDE) [facility]   Labs 9/7/24    BP Readings from Last 3 Encounters:   08/22/24 (!) 156/92   08/07/23 130/82   01/14/23 130/84

## 2024-10-16 NOTE — TELEPHONE ENCOUNTER
Delivery Read From Message Type Attachments Subject   10/14/2024  1:45 PM Y Barby Siddiqui, RN Patient Medical Advice Request  blood pressure   Sorry, yes tonight I'm at 130 over 88, kind of high, but my Mom went to the hospital yesterday and found out my Dad is not doing well from home hospice nurse.so anxiety is up, and I know I still need blood work looked at again, I'll get in as soon as I can.     1 month supply sent per Saundra

## 2024-11-06 ENCOUNTER — TELEPHONE (OUTPATIENT)
Dept: FAMILY MEDICINE CLINIC | Facility: CLINIC | Age: 49
End: 2024-11-06

## 2024-11-06 NOTE — TELEPHONE ENCOUNTER
Patient scheduled   Future Appointments   Date Time Provider Department Center   11/7/2024  8:00 AM Shadia Rudd APRN [facility]   11/11/2024  2:30 PM  MR RM3 (3T WIDE)  [facility]

## 2024-11-07 ENCOUNTER — OFFICE VISIT (OUTPATIENT)
Dept: FAMILY MEDICINE CLINIC | Facility: CLINIC | Age: 49
End: 2024-11-07
Payer: COMMERCIAL

## 2024-11-07 VITALS
TEMPERATURE: 98 F | WEIGHT: 149 LBS | BODY MASS INDEX: 23 KG/M2 | SYSTOLIC BLOOD PRESSURE: 130 MMHG | HEART RATE: 96 BPM | DIASTOLIC BLOOD PRESSURE: 84 MMHG | OXYGEN SATURATION: 98 %

## 2024-11-07 DIAGNOSIS — I10 WHITE COAT SYNDROME WITH DIAGNOSIS OF HYPERTENSION: ICD-10-CM

## 2024-11-07 DIAGNOSIS — D72.818 OTHER DECREASED WHITE BLOOD CELL (WBC) COUNT: ICD-10-CM

## 2024-11-07 DIAGNOSIS — E78.1 HYPERTRIGLYCERIDEMIA WITHOUT HYPERCHOLESTEROLEMIA: ICD-10-CM

## 2024-11-07 DIAGNOSIS — R07.89 ATYPICAL CHEST PAIN: Primary | ICD-10-CM

## 2024-11-07 DIAGNOSIS — F41.8 SITUATIONAL ANXIETY: ICD-10-CM

## 2024-11-07 DIAGNOSIS — I10 ESSENTIAL HYPERTENSION: ICD-10-CM

## 2024-11-07 DIAGNOSIS — F17.290 OTHER TOBACCO PRODUCT NICOTINE DEPENDENCE, UNCOMPLICATED: ICD-10-CM

## 2024-11-07 DIAGNOSIS — Z82.49 FAMILY HISTORY OF MI (MYOCARDIAL INFARCTION): ICD-10-CM

## 2024-11-07 PROCEDURE — 3079F DIAST BP 80-89 MM HG: CPT | Performed by: NURSE PRACTITIONER

## 2024-11-07 PROCEDURE — G2211 COMPLEX E/M VISIT ADD ON: HCPCS | Performed by: NURSE PRACTITIONER

## 2024-11-07 PROCEDURE — 99214 OFFICE O/P EST MOD 30 MIN: CPT | Performed by: NURSE PRACTITIONER

## 2024-11-07 PROCEDURE — 3075F SYST BP GE 130 - 139MM HG: CPT | Performed by: NURSE PRACTITIONER

## 2024-11-07 RX ORDER — LORAZEPAM 1 MG/1
1 TABLET ORAL
COMMUNITY
Start: 2024-11-06 | End: 2024-11-10

## 2024-11-07 NOTE — PROGRESS NOTES
HPI:     Patient is here for ER follow up. Went to ER yesterday for 4 day history of left-sided chest pain that radiated down left arm. EKG normal. Troponin negative. Has history of hypertension and blood pressure has been elevated. Has been under increasing stress. Lost father last night (had Parkinson's). Mom is also in hospital with COPD and MI. Feels his symptoms were anxiety/panic attack. Reports improving chest discomfort.     Was not taking Amlodipine and Losartan because he was unclear that it would be okay to take together. Has been taking both since yesterday.     Current Outpatient Medications   Medication Sig Dispense Refill    losartan 100 MG Oral Tab TAKE 1 TABLET(100 MG) BY MOUTH DAILY 30 tablet 0    AMLODIPINE 5 MG Oral Tab TAKE 1 TABLET(5 MG) BY MOUTH DAILY 30 tablet 0    Multiple Vitamins-Minerals (TAB-A-PETER) Oral Tab Take 1 tablet by mouth daily.      Cholecalciferol (VITAMIN D) 50 MCG (2000 UT) Oral Tab Take 2,000 Units by mouth daily.      LORazepam 1 MG Oral Tab Take 1 tablet (1 mg total) by mouth.        Past Medical History:    Essential hypertension      History reviewed. No pertinent surgical history.   Family History   Problem Relation Age of Onset    Dementia Father     Other (parkinson) Paternal Grandfather     Cancer Neg       Social History     Socioeconomic History    Marital status:    Tobacco Use    Smoking status: Every Day     Current packs/day: 0.00     Types: Cigarettes     Last attempt to quit: 2018     Years since quittin.4    Smokeless tobacco: Current     Types: Chew    Tobacco comments:     Quit smoking about 2 months ago     Does do zyn pouches 3/4 a day   Vaping Use    Vaping status: Never Used   Substance and Sexual Activity    Alcohol use: Yes     Alcohol/week: 14.0 standard drinks of alcohol     Types: 14 Cans of beer per week     Comment: 2 DAILY BEERS    Drug use: Never   Other Topics Concern    Caffeine Concern No    Exercise No    Seat Belt No     Special Diet No    Stress Concern No    Weight Concern No     Social Drivers of Health     Physical Activity: Unknown (5/27/2020)    [facility]    Exercise Vital Sign     Days of Exercise per Week: 0 days         REVIEW OF SYSTEMS:   Review of Systems   Constitutional:  Negative for chills, fatigue and fever.   Respiratory:  Negative for cough, chest tightness and shortness of breath.    Cardiovascular:  Negative for chest pain and palpitations.   Psychiatric/Behavioral:  The patient is nervous/anxious.        EXAM:   /84   Pulse 96   Temp 98.4 °F (36.9 °C)   Wt 149 lb (67.6 kg)   SpO2 98%   BMI 22.66 kg/m²   Physical Exam  Constitutional:       General: He is not in acute distress.     Appearance: He is not ill-appearing.   Cardiovascular:      Rate and Rhythm: Normal rate and regular rhythm.      Heart sounds: Normal heart sounds. No murmur heard.  Pulmonary:      Effort: Pulmonary effort is normal.      Breath sounds: Normal breath sounds.   Neurological:      General: No focal deficit present.      Mental Status: He is alert.   Psychiatric:         Mood and Affect: Mood normal.         ASSESSMENT AND PLAN:   Diagnoses and all orders for this visit:    Atypical chest pain  -     Cardio Referral - Internal  -     CARD TREADMILL STRESS, ADULT (CPT=93017); Future  -     CARD ECHO 2D DOPPLER (CPT=93306); Future    Essential hypertension  -     Cardio Referral - Internal  -     CARD TREADMILL STRESS, ADULT (CPT=93017); Future  -     CARD ECHO 2D DOPPLER (CPT=93306); Future    White coat syndrome with diagnosis of hypertension  -     Cardio Referral - Internal  -     CARD TREADMILL STRESS, ADULT (CPT=93017); Future  -     CARD ECHO 2D DOPPLER (CPT=93306); Future    Other tobacco product nicotine dependence, uncomplicated  -     CARD TREADMILL STRESS, ADULT (CPT=93017); Future  -     CARD ECHO 2D DOPPLER (CPT=93306); Future    Hypertriglyceridemia without  hypercholesterolemia  -     CARD TREADMILL STRESS, ADULT (CPT=93017); Future  -     CARD ECHO 2D DOPPLER (CPT=93306); Future    Situational anxiety    Other decreased white blood cell (WBC) count  -     Oncology/Hematology Referral - In Network    Reviewed ER records  Recommend echo, stress test and cardiology consult  Blood pressure improved  Reviewed lab work, WBC remain low. Recommend heme consult  Declined vaccinations

## 2024-11-07 NOTE — PATIENT INSTRUCTIONS
4 Square Breathing:   -breath in for 4 counts   -hold for 4 counts   -breath out for 4 counts   -hold for 4 counts  Do this for 4-5 rounds when feeling increased anxiety/panic

## 2024-11-11 ENCOUNTER — HOSPITAL ENCOUNTER (OUTPATIENT)
Dept: MRI IMAGING | Facility: HOSPITAL | Age: 49
Discharge: HOME | End: 2024-11-11
Attending: STUDENT IN AN ORGANIZED HEALTH CARE EDUCATION/TRAINING PROGRAM
Payer: OTHER MISCELLANEOUS

## 2024-11-11 DIAGNOSIS — M67.40 GANGLION CYST: ICD-10-CM

## 2024-11-11 PROCEDURE — 73221 MRI JOINT UPR EXTREM W/O DYE: CPT | Performed by: STUDENT IN AN ORGANIZED HEALTH CARE EDUCATION/TRAINING PROGRAM

## 2024-11-12 ENCOUNTER — TELEPHONE (OUTPATIENT)
Facility: CLINIC | Age: 49
End: 2024-11-12

## 2024-11-12 NOTE — TELEPHONE ENCOUNTER
Spoke with patient to schedule follow up and review MRI per recommendation of  Scheduled for 11/20 at 3pm  A refill request was received for:    Future Appointments   Date Time Provider Department Center   11/20/2024  3:00 PM Brandon Thomas MD [facility] [address]       Brandon Thomas MD  [facility]  It doesn't look like Álvaro has a follow-up scheduled with me, but if he would like to discuss surgical options based on these MRI results I am happy to see him.

## 2024-11-21 ENCOUNTER — OFFICE VISIT (OUTPATIENT)
Facility: CLINIC | Age: 49
End: 2024-11-21
Payer: OTHER MISCELLANEOUS

## 2024-11-21 DIAGNOSIS — R22.32 MASS OF LEFT HAND: Primary | ICD-10-CM

## 2024-11-21 PROCEDURE — 99214 OFFICE O/P EST MOD 30 MIN: CPT | Performed by: STUDENT IN AN ORGANIZED HEALTH CARE EDUCATION/TRAINING PROGRAM

## 2024-11-21 NOTE — PROGRESS NOTES
Clinic Note     No Known Allergies    CC: Left wrist pain with associated mass    HPI:   From prior visit 10/8/24:  This 49 year old RHD male presents with left wrist pain since 2024. 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 He has seen several providers since that time with persistent left wrist pain, despite treatments such as bracing, rest, OTC meds. He notes an associated mass with the wrist pain as well. His pain is almost directly centered over this mass.     Today's visit 24:  Patient reports he continues to have pain directly over the volar radial wrist mass. His symptoms are unchanged. He has pain with palpation of the mass and when moving his wrist in extreme ulnar or radial deviation which causes pressure on the mass itself, and subsequent pain. No numbness or tingling. He has been wearing a brace without much relief. He returns today to review MRI results.    Review of Systems:  Negative unless stated in HPI.    History/Other:   Past Medical History:    Essential hypertension     No past surgical history on file.  Current Outpatient Medications   Medication Sig Dispense Refill    losartan 100 MG Oral Tab TAKE 1 TABLET(100 MG) BY MOUTH DAILY 30 tablet 0    AMLODIPINE 5 MG Oral Tab TAKE 1 TABLET(5 MG) BY MOUTH DAILY 30 tablet 0    Multiple Vitamins-Minerals (TAB-A-PETER) Oral Tab Take 1 tablet by mouth daily.      Cholecalciferol (VITAMIN D) 50 MCG (2000 UT) Oral Tab Take 2,000 Units by mouth daily.       Family History   Problem Relation Age of Onset    Dementia Father     Other (parkinson) Paternal Grandfather     Cancer Neg      Social History     Occupational History    Not on file   Tobacco Use    Smoking status: Every Day     Current packs/day: 0.00     Types: Cigarettes     Last attempt to quit: 2018     Years since quittin.4    Smokeless tobacco: Current     Types: Chew    Tobacco comments:     Quit smoking about 2 months  ago     Does do zyn pouches 3/4 a day   Vaping Use    Vaping status: Never Used   Substance and Sexual Activity    Alcohol use: Yes     Alcohol/week: 14.0 standard drinks of alcohol     Types: 14 Cans of beer per week     Comment: 2 DAILY BEERS    Drug use: Never    Sexual activity: Not on file        Physical Exam:         There were no vitals taken for this visit.    Constitutional: NAD. AOx3. Well-developed and Well-nourished.   Psychiatric: Normal mood/ affect/ behavior. Judgment and thought content normal.     Left Upper Extremity:     Inspection    Skin intact. No skin lesions. No obvious mass visualized. No crepitus to ROM. Mild swelling.   Palpation    no tenderness to palpation at the radial styloid  no tenderness to palpation at the DRUJ  no tenderness to palpation at the SL interval  no tenderness to palpation at the 1st carpometacarpal joint   no Tenderness to palpation of the ulnar fovea, TFCC  2cm by 2cm soft, mobile, well-circumscribed mass located at the radial aspect of the left wrist. This transilluminates. He is tender only directly over this mass.        ROM is grossly normal including flexion / extension, and pronosupination of the forearm.    Able to make a full composite fist   Able to extend all digits     Neurovascular    Normal sensation in the median, ulnar, and radial nerve distribution. Normal motor function of muscles innervated by median/AIN, ulnar, and radial/PIN nerves.    Normally perfused hand(s).                   Diagnostic Studies:  I reviewed the images independently from the professional interpretation, and discussed my interpretation of the pertinent findings with patient/family.    MRI of the left wrist was reviewed and shows a multiloculated ganglion cyst over the volar radial wrist, directly over the radial styloid. Also noted is thickening of the ECU tendon at the level of the wrist joint. Trace cystic change within the lunate proximal aspect and central capitate but no  edema of these bones and architecture as well as joint surfaces are well preserved.    Assessment/Plan:  49 year old male with Left volar wrist mass    I reviewed the patient's electronic medical record, including the pertinent office notes, medical/surgical history, medications and images. I specifically reviewed the images noted above, independently and discussed my independent interpretation of these images in combination with the pertinent positive and negative findings in my clinical exam with the patient    Left volar wrist mass, symptomatic.    The nature of the condition was discussed with the patient today including reviewing the xrays. The risks/benefits, pros/cons and reasonable expectations of treatment options were also discussed today. Education and counselling was given for the above diagnosis.  I discussed with the patient that more than likely this represents a benign ganglion cyst. These are benign masses and as such observation is a perfectly reasonable option. However the patient is bothered by this cyst on a daily basis, and activity modification as well as bracing has not helped his symptoms. He is directly tender over the cyst and has no ulnar sided tenderness, therefore I believe proceeding with cyst excision alone is very reasonable. I counseled the patient that risk of recurrence is significant, especially given the multi-loculated nature of his cyst. He is quite interested in proceeding with surgical excision of the cyst.    Surgical Procedure Consent  The patient was indicated for surgery as patient had failed nonoperative management and other appropriate nonoperative care. The symptoms were progressive and more severe and patient did not want to continue with nonoperative care and wanted surgery at this point. Alternatives to continued nonoperative care, however, were discussed and offered.     Today, I discussed with Álvaro Moe III the surgical procedure consent. I reviewed the  nature of the condition, the indications for surgery, the procedure itself, the post-operative course, the expected outcomes as well as the surgical risks and alternatives. There was no guarantee of the outcome. The potential risks were again discussed with the patient which include, but are not limited to: injury to nearby nerves or vessels, decreased sensation, bleeding, infection, wound healing complications, scar formation, pain, recurrence or continued symptoms, need for further surgery, complex regional pain syndrome, and anesthesia risks. Álvaro oMe III demonstrated understanding and asked appropriate questions which I answered to their satisfaction. Following this discussion, Álvaro Moe III wished to proceed with surgical intervention.    Follow up date of surgery.    Brandon Thomas MD   Hand, Wrist, & Elbow Surgery  [email or web address]

## 2024-11-22 ENCOUNTER — TELEPHONE (OUTPATIENT)
Dept: ORTHOPEDICS CLINIC | Facility: CLINIC | Age: 49
End: 2024-11-22

## 2024-11-22 DIAGNOSIS — I10 ESSENTIAL HYPERTENSION: ICD-10-CM

## 2024-11-22 DIAGNOSIS — R22.32 MASS OF LEFT HAND: Primary | ICD-10-CM

## 2024-11-22 RX ORDER — LOSARTAN POTASSIUM 100 MG/1
100 TABLET ORAL DAILY
Qty: 90 TABLET | Refills: 1 | Status: SHIPPED | OUTPATIENT
Start: 2024-11-22 | End: 2025-02-20

## 2024-11-22 NOTE — TELEPHONE ENCOUNTER
Date of Surgery: \[facility]\""C 2024       Post Op Appt:   230PM PLFD    Case ID: [id number]     Notes:             SURGICAL BOOKING SHEET   Name: Álvaro Moe III  MRN: [id number]   : 6/3/1975     Surgical Date:     [facility]   Surgical Consent:    left wrist mass removal   Diagnosis:         ICD-10-CM     1. Mass of left hand  R22.32            Workers Comp: Yes   Procedure Codes:    Excision of ganglion cyst (63836)   Disposition:    Outpatient   Operative Time:    1 hr   Antibiotics:    Ancef 2g   Anesthesia Type:    Monitored Anesthesia Care (MAC) + Regional - Supraclavicular   Clearance:     None   Equipment:    Wrist Mass Excision: *Please see Dr. Thomas \"WRIST SOFT TISSUE\" Pref Card* Harding Blade, Local Pre-Mix (1% lidocaine w/ epi, 0.5% marcaine, 50/50 mix and 10cc total), upper arm 18-inch Tourniquet, 4-0 monocryl PS-2, Dermabond, Steri strips, 4x8s, 3 inch radha, 3 inch ace wrap    Assistant:    Assistant Surgery: No   Follow Up:    Follow up 10-14 days after surgery   Pain Medication:    OTC   Therapy:    None

## 2024-12-04 NOTE — TELEPHONE ENCOUNTER
Per [facility]  Pt. Álvaro Moe was seen in ER on 11/6/24 with chest pain, work up in the ER was normal. Pt. did follow up with PCP 11/7/24, they ordered stress, test, 2 Decho and cariology consult. This will need to be completed prior to his elective hand surgrery along with cardiology clearance. Just spoke to patient and is informed.

## 2024-12-04 NOTE — TELEPHONE ENCOUNTER
Spoke with patient and he will get back to me in regards to Cardiologist, We will leave him scheduled as in for now but may need to move surgery.

## 2024-12-09 ENCOUNTER — HOSPITAL ENCOUNTER (OUTPATIENT)
Dept: CV DIAGNOSTICS | Age: 49
Discharge: HOME | End: 2024-12-09
Attending: NURSE PRACTITIONER
Payer: COMMERCIAL

## 2024-12-09 ENCOUNTER — TELEPHONE (OUTPATIENT)
Dept: FAMILY MEDICINE CLINIC | Facility: CLINIC | Age: 49
End: 2024-12-09

## 2024-12-09 DIAGNOSIS — R07.89 ATYPICAL CHEST PAIN: ICD-10-CM

## 2024-12-09 DIAGNOSIS — E78.1 HYPERTRIGLYCERIDEMIA WITHOUT HYPERCHOLESTEROLEMIA: ICD-10-CM

## 2024-12-09 DIAGNOSIS — F17.290 OTHER TOBACCO PRODUCT NICOTINE DEPENDENCE, UNCOMPLICATED: ICD-10-CM

## 2024-12-09 DIAGNOSIS — I10 ESSENTIAL HYPERTENSION: ICD-10-CM

## 2024-12-09 DIAGNOSIS — I10 WHITE COAT SYNDROME WITH DIAGNOSIS OF HYPERTENSION: ICD-10-CM

## 2024-12-09 PROCEDURE — 93018 CV STRESS TEST I&R ONLY: CPT | Performed by: NURSE PRACTITIONER

## 2024-12-09 PROCEDURE — 93306 TTE W/DOPPLER COMPLETE: CPT | Performed by: NURSE PRACTITIONER

## 2024-12-09 PROCEDURE — 93017 CV STRESS TEST TRACING ONLY: CPT | Performed by: NURSE PRACTITIONER

## 2024-12-09 NOTE — TELEPHONE ENCOUNTER
----- Message from Shadia Rudd sent at 12/9/2024  9:20 AM CST -----  Results reviewed.   Normal ejection fraction  Grade 1 diastolic dysfunction  Stress test today and needs to see cardiology

## 2024-12-11 ENCOUNTER — TELEPHONE (OUTPATIENT)
Dept: FAMILY MEDICINE CLINIC | Facility: CLINIC | Age: 49
End: 2024-12-11

## 2024-12-11 PROCEDURE — 88304 TISSUE EXAM BY PATHOLOGIST: CPT | Performed by: STUDENT IN AN ORGANIZED HEALTH CARE EDUCATION/TRAINING PROGRAM

## 2024-12-11 PROCEDURE — 88305 TISSUE EXAM BY PATHOLOGIST: CPT | Performed by: STUDENT IN AN ORGANIZED HEALTH CARE EDUCATION/TRAINING PROGRAM

## 2024-12-11 NOTE — TELEPHONE ENCOUNTER
----- Message from Shadia uRdd sent at 12/11/2024  7:37 AM CST -----  Results reviewed.   Normal stress test

## 2024-12-12 ENCOUNTER — DOCUMENTATION ONLY (OUTPATIENT)
Facility: CLINIC | Age: 49
End: 2024-12-12

## 2024-12-12 NOTE — PROGRESS NOTES
Post-Op Telephone Call Note:    Procedure: 12/11/24    Date of Procedure: Left wrist mass excision    Álvaro Moe III is post-operative day one status post the above surgery. I called and left a voicemail for the patient today. I once again discussed the expected post-operative course with the patient. I will see Álvaro Moe III as scheduled on 12/19/24. Should they have any questions or concerns before their first post-operative appointment with me they were instructed to reach out to the office sooner.     Brandon Thomas MD   Hand, Wrist, & Elbow Surgery  [email or web address]

## 2024-12-16 ENCOUNTER — TELEPHONE (OUTPATIENT)
Dept: ORTHOPEDICS CLINIC | Facility: CLINIC | Age: 49
End: 2024-12-16

## 2024-12-16 NOTE — TELEPHONE ENCOUNTER
Patient called to request an note for his employer stating he can return to work.     He took off work last Thurs & Fri and returned to work today.    Please send to his MyChart and he will forward to his employer.    OK to send tomorrow if needed.

## 2024-12-19 ENCOUNTER — OFFICE VISIT (OUTPATIENT)
Facility: CLINIC | Age: 49
End: 2024-12-19
Payer: COMMERCIAL

## 2024-12-19 DIAGNOSIS — R22.32 MASS OF LEFT HAND: Primary | ICD-10-CM

## 2024-12-19 PROCEDURE — 99024 POSTOP FOLLOW-UP VISIT: CPT | Performed by: STUDENT IN AN ORGANIZED HEALTH CARE EDUCATION/TRAINING PROGRAM

## 2024-12-19 NOTE — PROGRESS NOTES
Clinic Note     Allergies[1]    Date of Surgery: 12/11/24    Procedure: Left wrist mass removal    HPI:  This patient is status post the above surgery. Overall doing excellent. Pain controlled with over the counter medication.      Review of Systems:  Negative unless stated in HPI.    History/Other:   Past Medical History:    Essential hypertension    High blood pressure     No past surgical history on file.  Current Outpatient Medications   Medication Sig Dispense Refill    ibuprofen 600 MG Oral Tab Take 1 tablet (600 mg total) by mouth every 6 (six) hours as needed for Pain. 30 tablet 0    Acetaminophen ER (TYLENOL 8 HOUR) 650 MG Oral Tab CR Take 1 tablet (650 mg total) by mouth every 8 (eight) hours as needed for Pain. 30 tablet 0    losartan 100 MG Oral Tab Take 1 tablet (100 mg total) by mouth daily. (Patient taking differently: Take 100 mg by mouth at bedtime.) 90 tablet 1    Multiple Vitamins-Minerals (TAB-A-PETER) Oral Tab Take 1 tablet by mouth daily.      Cholecalciferol (VITAMIN D) 50 MCG (2000 UT) Oral Tab Take 2,000 Units by mouth daily.         Physical Exam:     There were no vitals taken for this visit.    Constitutional: NAD. AOx3. Well-developed and Well-nourished.   Psychiatric: Normal mood/ affect/ behavior. Judgment and thought content normal.     Left Upper Extremity:     Inspection    Surgical wound is clean, dry, intact without signs of infection. No signs of wound dehiscence.    Palpation    Non tender to palpation over surgical wound.      ROM    Able to flex, extend, pronosupinate the wrist with ease.    Able to make a full fist and extend all fingers with ease.     Neurovascular    Normal sensation in the median, ulnar, and radial nerve distribution. Normal motor function of muscles innervated by median/AIN, ulnar, and radial/PIN nerves.    Normally perfused hand(s).                   Surgical Pathology:  Consistent with ganglion cyst.    Assessment/Plan:  49 year old male status post  the above surgery.    I reviewed the patient's electronic medical record, including the pertinent office notes, medical/surgical history, medications and images. I specifically reviewed the images noted above, independently and discussed my independent interpretation of these images in combination with the pertinent positive and negative findings in my clinical exam with the patient    Status post the above surgery on 12/11/24.    Overall doing well. Álvaro can now transition to a removable wrist brace for lifting activities and sleeping for the next 2 weeks, followed by gradual resumption of activities as tolerated.    Follow Up: 4 weeks for repeat eval, ok to cancel if doing well    Brandon Thomas MD   Hand, Wrist, & Elbow Surgery  [email or web address]       [1] No Known Allergies

## 2025-06-04 DIAGNOSIS — I10 ESSENTIAL HYPERTENSION: ICD-10-CM

## 2025-06-04 RX ORDER — LOSARTAN POTASSIUM 100 MG/1
100 TABLET ORAL DAILY
Qty: 90 TABLET | Refills: 1 | Status: SHIPPED | OUTPATIENT
Start: 2025-06-04

## 2025-06-04 NOTE — TELEPHONE ENCOUNTER
Last visit in this office with MOHINDER Thomas on 11/7/2024     Last documented BP on 12/11/2024 122/90 and 117/83       Hypertension Medications Protocol [id number]/04/2025 11:48 AM   Protocol Details Last BP reading less than 140/90    Medication is active on med list    CMP or BMP in past 12 months    In person appointment or virtual visit in the past 12 mos or appointment in next 3 mos    EGFRCR or GFRNAA > 50       To be filled at: [id number] [place], [place] - [address] AT [facility] [address], [phone], [phone]

## (undated) NOTE — LETTER
24        Orthopedic Surgery   Pre-Operative Clearance Request    Patient Name:   Álvaro Moe III             :   6/3/1975    Surgeon: Dr. Thomas             Date of Surgery:  2024    Surgical Procedure: Left wrist mass removal       MUST COMPLETE ALL OF THE FOLLOWING 2-3 WEEKS PRIOR TO YOUR SURGERY TO AVOID CANCELLATION, DUE TO THE RULE THEIR WILL BE NO EXCEPTIONS!             [x]  Cardiac Clearance                             **Please fax test results, H&P, and clearance to [phone] and to [facility] at [phone]**

## (undated) NOTE — LETTER
Date: 12/17/2024    Patient Name: Álvaro Moe III          To Whom it may concern:    This letter has been written at the patient's request. The above patient was seen at [facility] for treatment of a medical condition.    The patient may return to work with >1 pound weight limitations on left upper extremity.        Sincerely,    MD Brandon Rivera MD   Hand, Wrist, & Elbow Surgery  [email or web address]

## (undated) NOTE — LETTER
Date: 5/12/2020    Patient Name: Edil West          To Whom it may concern: This letter has been written at the patient's request. The above patient was seen at the [facility] for treatment of a medical condition.     This patient adrian

## (undated) NOTE — LETTER
24      Orthopedic Surgery   Pre-Operative Clearance Request    Patient Name:   Álvaro Moe III             :   6/3/1975    Surgeon: Dr. Thomas             Date of Surgery: 2024    Surgical Procedure: Left wrist mass removal       MUST COMPLETE ALL OF THE FOLLOWING 2-3 WEEKS PRIOR TO YOUR SURGERY TO AVOID CANCELLATION, DUE TO THE RULE THEIR WILL BE NO EXCEPTIONS!             [x]  Cardiac Clearance                             **Please fax test results, H&P, and clearance to [phone] and to [facility] at [phone]**